# Patient Record
Sex: FEMALE | Race: WHITE
[De-identification: names, ages, dates, MRNs, and addresses within clinical notes are randomized per-mention and may not be internally consistent; named-entity substitution may affect disease eponyms.]

---

## 2019-11-04 ENCOUNTER — HOSPITAL ENCOUNTER (OUTPATIENT)
Dept: HOSPITAL 92 - BICMAMMO | Age: 74
Discharge: HOME | End: 2019-11-04
Attending: FAMILY MEDICINE
Payer: MEDICARE

## 2019-11-04 DIAGNOSIS — Z13.820: Primary | ICD-10-CM

## 2019-11-04 DIAGNOSIS — M85.89: ICD-10-CM

## 2019-11-04 DIAGNOSIS — Z78.0: ICD-10-CM

## 2019-11-04 PROCEDURE — 77080 DXA BONE DENSITY AXIAL: CPT

## 2019-11-04 NOTE — BD
DEXA BONE DENSITY STUDY:

 

HISTORY:

Postmenopausal.

 

LUMBAR SPINE          BMD (g/cm2)     T-SCORE

L1                    0.753           -2.2

L2                    0.824           -1.9

L3                    0.854           -2.1

L4                    0.875           -1.7

TOTAL                 0.832           -2.0

 

LEFT FEMORAL NECK     0.640           -1.9

TOTAL                 0.850           -0.8

 

IMPRESSION:

1. Osteopenia of the lumbar spine and left femoral neck.

 

2. Ten year fracture risk for a major osteoporotic fracture is 10% and for a hip fracture is 2.5%. [facility].

 

POS: [facility]